# Patient Record
Sex: FEMALE | Race: WHITE | HISPANIC OR LATINO | Employment: UNEMPLOYED | URBAN - METROPOLITAN AREA
[De-identification: names, ages, dates, MRNs, and addresses within clinical notes are randomized per-mention and may not be internally consistent; named-entity substitution may affect disease eponyms.]

---

## 2019-01-02 ENCOUNTER — TELEPHONE (OUTPATIENT)
Dept: FAMILY MEDICINE | Facility: CLINIC | Age: 49
End: 2019-01-02

## 2019-01-02 NOTE — TELEPHONE ENCOUNTER
----- Message from Yesenia Carrington sent at 1/2/2019  1:28 PM CST -----  Contact: [phone]  Pt its requesting a new pt appointment as soon as possible , states she is having problems . Pt speaks Indonesian . Please advise

## 2019-01-03 ENCOUNTER — OFFICE VISIT (OUTPATIENT)
Dept: FAMILY MEDICINE | Facility: CLINIC | Age: 49
End: 2019-01-03

## 2019-01-03 VITALS
SYSTOLIC BLOOD PRESSURE: 114 MMHG | DIASTOLIC BLOOD PRESSURE: 80 MMHG | WEIGHT: 155 LBS | HEIGHT: 65 IN | BODY MASS INDEX: 25.83 KG/M2 | OXYGEN SATURATION: 97 % | HEART RATE: 73 BPM

## 2019-01-03 DIAGNOSIS — F41.1 GENERALIZED ANXIETY DISORDER: ICD-10-CM

## 2019-01-03 DIAGNOSIS — M25.511 CHRONIC RIGHT SHOULDER PAIN: Primary | ICD-10-CM

## 2019-01-03 DIAGNOSIS — G56.03 BILATERAL CARPAL TUNNEL SYNDROME: ICD-10-CM

## 2019-01-03 DIAGNOSIS — G89.29 CHRONIC RIGHT SHOULDER PAIN: Primary | ICD-10-CM

## 2019-01-03 PROCEDURE — 99999 PR PBB SHADOW E&M-EST. PATIENT-LVL III: CPT | Mod: PBBFAC,,, | Performed by: FAMILY MEDICINE

## 2019-01-03 PROCEDURE — 99202 PR OFFICE/OUTPT VISIT, NEW, LEVL II, 15-29 MIN: ICD-10-PCS | Mod: S$PBB,,, | Performed by: FAMILY MEDICINE

## 2019-01-03 PROCEDURE — 99202 OFFICE O/P NEW SF 15 MIN: CPT | Mod: S$PBB,,, | Performed by: FAMILY MEDICINE

## 2019-01-03 PROCEDURE — 99999 PR PBB SHADOW E&M-EST. PATIENT-LVL III: ICD-10-PCS | Mod: PBBFAC,,, | Performed by: FAMILY MEDICINE

## 2019-01-03 PROCEDURE — 99213 OFFICE O/P EST LOW 20 MIN: CPT | Mod: PBBFAC,PO | Performed by: FAMILY MEDICINE

## 2019-01-03 RX ORDER — FLUOXETINE 20 MG/1
20 TABLET ORAL DAILY
COMMUNITY
End: 2019-01-03 | Stop reason: ALTCHOICE

## 2019-01-03 RX ORDER — NAPROXEN 500 MG/1
500 TABLET ORAL 2 TIMES DAILY WITH MEALS
Qty: 60 TABLET | Refills: 1 | Status: SHIPPED | OUTPATIENT
Start: 2019-01-03 | End: 2022-07-18

## 2019-01-03 RX ORDER — ESCITALOPRAM OXALATE 10 MG/1
10 TABLET ORAL DAILY
Qty: 30 TABLET | Refills: 1 | Status: SHIPPED | OUTPATIENT
Start: 2019-01-03 | End: 2022-07-18

## 2019-01-03 NOTE — PROGRESS NOTES
Subjective:       Patient ID: Cherry Thomas is a 38 y.o. female.    Chief Complaint: Numbness (bilateral arm/hands x 2 wks)    HPI   39 yo [place]ian female with anxiety disorder presents c/o numbness in bilateral arms and hands. Pt notes intermittent symptoms for the past 5 days. Pt was seen in ER at [place] for this complaint. Pt notes that an evaluation of her heart and labs was normal in the ER. Pt notes that she did not have heart problems or an stroke. Pt was told that her symptoms were related to anxiety or dehydration. Pt has been told that she had carpal tunnel syndrome in [place]. Pt notes pain and numbness in left arm and pain in right arm. Symptoms are worse at night and disturb her sleep. Pt notes a burning sensation in her upper back. 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  Pt notes being very anxious. Pt has been on Fluoxetine for the past 2 years.  Review of Systems   Musculoskeletal:        See HPI   Neurological:        See HPI   Psychiatric/Behavioral:        See HPI       Objective:      Physical Exam   Constitutional: She is oriented to person, place, and time. She appears well-developed and well-nourished. No distress.   HENT:   Head: Normocephalic and atraumatic.   Neck: Normal range of motion. Neck supple. No JVD present. No thyromegaly present.   Cardiovascular: Normal rate, regular rhythm, normal heart sounds and intact distal pulses. Exam reveals no gallop and no friction rub.   No murmur heard.  Pulmonary/Chest: Effort normal and breath sounds normal. She has no wheezes. She has no rales.   Abdominal: Soft. Bowel sounds are normal. She exhibits no mass. There is no tenderness.   Musculoskeletal:        Right shoulder: She exhibits decreased range of motion and tenderness. She exhibits no bony tenderness and no deformity.        Left shoulder: She exhibits normal range of motion, no tenderness, no bony tenderness and no deformity.   Lymphadenopathy:     She has no  cervical adenopathy.   Neurological: She is alert and oriented to person, place, and time. She has normal strength and normal reflexes. No cranial nerve deficit or sensory deficit.   Positive Phalen's test bilaterally. Negative Tinel's test bilaterally.   Skin: Skin is warm and dry. She is not diaphoretic.   Psychiatric: Her speech is normal and behavior is normal. Judgment and thought content normal. Her mood appears anxious. She is not actively hallucinating. Cognition and memory are normal. She is attentive.   Vitals reviewed.      Assessment:       See plan  Plan:       Cherry was seen today for numbness.    Diagnoses and all orders for this visit:    Chronic right shoulder pain  -     naproxen (NAPROSYN) 500 MG tablet; Take 1 tablet (500 mg total) by mouth 2 (two) times daily with meals.    Bilateral carpal tunnel syndrome  -     naproxen (NAPROSYN) 500 MG tablet; Take 1 tablet (500 mg total) by mouth 2 (two) times daily with meals.  -    Wrist braces advised.    Generalized anxiety disorder: Uncontrolled  -     Start escitalopram oxalate (LEXAPRO) 10 MG tablet; Take 1 tablet (10 mg total) by mouth once daily.  -    Discontinue Fluoxetine     BMI 25.0-25.9,adult    F/U in 4 weeks

## 2019-11-15 DIAGNOSIS — Z12.39 BREAST CANCER SCREENING: ICD-10-CM

## 2021-02-25 ENCOUNTER — OFFICE VISIT (OUTPATIENT)
Dept: OBSTETRICS AND GYNECOLOGY | Facility: CLINIC | Age: 51
End: 2021-02-25

## 2021-02-25 VITALS
HEIGHT: 64 IN | WEIGHT: 152.56 LBS | BODY MASS INDEX: 26.05 KG/M2 | SYSTOLIC BLOOD PRESSURE: 118 MMHG | DIASTOLIC BLOOD PRESSURE: 80 MMHG

## 2021-02-25 DIAGNOSIS — Z01.419 GYNECOLOGIC EXAM NORMAL: ICD-10-CM

## 2021-02-25 DIAGNOSIS — R10.2 PELVIC PAIN: Primary | ICD-10-CM

## 2021-02-25 PROCEDURE — 99203 OFFICE O/P NEW LOW 30 MIN: CPT | Mod: S$PBB,,, | Performed by: SPECIALIST

## 2021-02-25 PROCEDURE — 88175 CYTOPATH C/V AUTO FLUID REDO: CPT

## 2021-02-25 PROCEDURE — 99213 OFFICE O/P EST LOW 20 MIN: CPT | Mod: PBBFAC,PN | Performed by: SPECIALIST

## 2021-02-25 PROCEDURE — 99999 PR PBB SHADOW E&M-EST. PATIENT-LVL III: ICD-10-PCS | Mod: PBBFAC,,, | Performed by: SPECIALIST

## 2021-02-25 PROCEDURE — 87624 HPV HI-RISK TYP POOLED RSLT: CPT

## 2021-02-25 PROCEDURE — 99999 PR PBB SHADOW E&M-EST. PATIENT-LVL III: CPT | Mod: PBBFAC,,, | Performed by: SPECIALIST

## 2021-02-25 PROCEDURE — 99203 PR OFFICE/OUTPT VISIT, NEW, LEVL III, 30-44 MIN: ICD-10-PCS | Mod: S$PBB,,, | Performed by: SPECIALIST

## 2021-02-25 RX ORDER — NAPROXEN 500 MG/1
500 TABLET ORAL 2 TIMES DAILY WITH MEALS
Qty: 60 TABLET | Refills: 2 | Status: SHIPPED | OUTPATIENT
Start: 2021-02-25 | End: 2022-02-25

## 2021-02-26 ENCOUNTER — TELEPHONE (OUTPATIENT)
Dept: OBSTETRICS AND GYNECOLOGY | Facility: CLINIC | Age: 51
End: 2021-02-26

## 2021-02-26 DIAGNOSIS — R10.2 PELVIC PAIN IN FEMALE: Primary | ICD-10-CM

## 2021-03-01 ENCOUNTER — TELEPHONE (OUTPATIENT)
Dept: OBSTETRICS AND GYNECOLOGY | Facility: CLINIC | Age: 51
End: 2021-03-01

## 2021-03-03 ENCOUNTER — TELEPHONE (OUTPATIENT)
Dept: OBSTETRICS AND GYNECOLOGY | Facility: CLINIC | Age: 51
End: 2021-03-03

## 2021-03-04 ENCOUNTER — TELEPHONE (OUTPATIENT)
Dept: OBSTETRICS AND GYNECOLOGY | Facility: CLINIC | Age: 51
End: 2021-03-04

## 2021-03-04 LAB
CLINICAL INFO: NORMAL
CYTO CVX: NORMAL
CYTOLOGIST CVX/VAG CYTO: NORMAL
CYTOLOGY CMNT CVX/VAG CYTO-IMP: NORMAL
CYTOLOGY PAP THIN PREP EXPLANATION: NORMAL
DATE OF PREVIOUS PAP: NORMAL
DATE PREVIOUS BX: NO
HPV I/H RISK 4 DNA CVX QL NAA+PROBE: NOT DETECTED
LMP START DATE: NORMAL
SPECIMEN SOURCE CVX/VAG CYTO: NORMAL
STAT OF ADQ CVX/VAG CYTO-IMP: NORMAL

## 2022-07-18 ENCOUNTER — OFFICE VISIT (OUTPATIENT)
Dept: OBSTETRICS AND GYNECOLOGY | Facility: CLINIC | Age: 52
End: 2022-07-18

## 2022-07-18 VITALS
OXYGEN SATURATION: 97 % | BODY MASS INDEX: 25.78 KG/M2 | HEART RATE: 100 BPM | DIASTOLIC BLOOD PRESSURE: 80 MMHG | TEMPERATURE: 98 F | WEIGHT: 151 LBS | RESPIRATION RATE: 18 BRPM | SYSTOLIC BLOOD PRESSURE: 132 MMHG | HEIGHT: 64 IN

## 2022-07-18 DIAGNOSIS — N83.202 CYST OF LEFT OVARY: ICD-10-CM

## 2022-07-18 DIAGNOSIS — R92.8 ABNORMAL MAMMOGRAM OF RIGHT BREAST: ICD-10-CM

## 2022-07-18 DIAGNOSIS — M79.601 ARM PAIN, DIFFUSE, RIGHT: ICD-10-CM

## 2022-07-18 DIAGNOSIS — R10.2 PELVIC PAIN: Primary | ICD-10-CM

## 2022-07-18 PROCEDURE — 99999 PR PBB SHADOW E&M-EST. PATIENT-LVL V: CPT | Mod: PBBFAC,,, | Performed by: OBSTETRICS & GYNECOLOGY

## 2022-07-18 PROCEDURE — 99215 PR OFFICE/OUTPT VISIT, EST, LEVL V, 40-54 MIN: ICD-10-PCS | Mod: S$PBB,,, | Performed by: OBSTETRICS & GYNECOLOGY

## 2022-07-18 PROCEDURE — 99999 PR PBB SHADOW E&M-EST. PATIENT-LVL V: ICD-10-PCS | Mod: PBBFAC,,, | Performed by: OBSTETRICS & GYNECOLOGY

## 2022-07-18 PROCEDURE — 99215 OFFICE O/P EST HI 40 MIN: CPT | Mod: PBBFAC,PO | Performed by: OBSTETRICS & GYNECOLOGY

## 2022-07-18 PROCEDURE — 99215 OFFICE O/P EST HI 40 MIN: CPT | Mod: S$PBB,,, | Performed by: OBSTETRICS & GYNECOLOGY

## 2022-07-18 NOTE — PROGRESS NOTES
"  Subjective:   Chief Complaint:  Ovarian Cyst       Patient ID: Cherry Salguero is a  51 y.o. female.  (Primary language:  Spanish.    Video  used: # [id number])    2022     She presents today due to the followin)  She reports over the last few years throbbing episodes of left lower quadrant pain.  She has a previous reported pelvic ultrasound which noted a possible cyst on the left ovary.  She was told it was "nothing to worry about" but is concerned regarding the persistent left lower quadrant pain.  The ultrasound was not performed within the [facility] system and the patient does not recall the exact location of the ultrasound.    2)    She reports right arm pain which runs from the shoulder to the wrist and is associated with arm numbness.  She reports a mammogram being performed outside of the [facility] system with an abnormal finding and the recommendation of follow-up ultrasound.   She had a lesion in the right axilla excised by Dermatology.  This was found to be in fibroepithelial polyp with inflammation in 2022.  Dermatology did not feel this was playing a role in her arm pain.      She reports during this process undergoing a mammogram.  This was performed outside the [facility] system.  She reports that further   Right breast studies were requested but she has not had these performed.    3) She is concerned regarding the possibility of a retained tampon.    While there are office notes within the [facility] system the patient reports a number of her test were performed at a "Health clinic in [place]" though she does not recall the name or actual location.    GYN & OB History  Patient's last menstrual period was 2022 (exact date).     Date of Last Pap: No result found    OB History    Para Term  AB Living   2 1 1   1 1   SAB IAB Ectopic Multiple Live Births                  # Outcome Date GA Lbr Alex/2nd Weight Sex Delivery Anes PTL Lv   2 AB            1 " Term               Obstetric Comments    Caesarean section x1 and SAB x1         Past Medical History:   Diagnosis Date    Abnormal Pap smear of cervix     Anxiety         Past Surgical History:   Procedure Laterality Date     SECTION      x 1 male    EYE SURGERY          Review of patient's allergies indicates:  No Known Allergies     Medications  No current outpatient medications on file.       Review of Systems  Review of Systems   Constitutional: Negative for fever and unexpected weight change.   HENT: Negative.    Respiratory: Negative for cough and shortness of breath.    Cardiovascular: Negative for chest pain.   Gastrointestinal: Negative for abdominal pain, nausea and vomiting.   Genitourinary: Negative for dysuria and urgency.          Gyn as per HPI   Musculoskeletal: Negative for myalgias.        Right arm pain and numbness as per HPI   Integumentary:  Negative for rash.   Neurological: Negative.  Negative for headaches.   Breast: negative.          As per HPI              Objective:      Vitals:    22 1305   BP: 132/80   Pulse: 100   Resp: 18   Temp: 98.2 °F (36.8 °C)     Physical Exam:   Constitutional: She appears well-developed and well-nourished.    HENT:   Head: Normocephalic.     Neck: No thyroid mass present.    Cardiovascular: Normal rate, regular rhythm and normal heart sounds.     Pulmonary/Chest: Effort normal and breath sounds normal. Right breast exhibits no mass, no nipple discharge and no skin change. Left breast exhibits no mass, no nipple discharge and no skin change.        Abdominal: Soft. There is no abdominal tenderness.     Genitourinary:    Inguinal canal, vagina, uterus, right adnexa and left adnexa normal.      Pelvic exam was performed with patient supine.   The external female genitalia was normal.   No external genitalia lesions identified,Cervix is normal. Right adnexum displays no mass and no tenderness. Left adnexum displays no mass and no tenderness. No  tenderness or bleeding in the vagina. Uterus is not tender. Normal urethral meatus.Urethra findings: no tendernessBladder findings: no bladder tenderness          Musculoskeletal:      Right lower leg: No edema.      Left lower leg: No edema.      Lymphadenopathy:     She has no cervical adenopathy. No inguinal adenopathy noted on the right or left side.    Neurological: She is alert. She has normal strength and normal reflexes.   No gross defects noted.  Equal right and left  strength    Skin: Skin is warm and dry.    Psychiatric: Mood normal.           Pap smear from February of 2021 noted no cervical abnormalities and was HPV negative.    Assessment:        1. Pelvic pain    2. Abnormal mammogram of right breast    3. Arm pain, diffuse, right    4. Cyst of left ovary         Plan:      Pelvic pain  -     US Pelvis Comp with Transvag NON-OB (xpd; Future; Expected date: 07/18/2022    Abnormal mammogram of right breast  -     US Breast Right Complete; Future; Expected date: 07/18/2022  -     Mammo Digital Diagnostic Bilat with Kojo; Future; Expected date: 07/18/2022    Arm pain, diffuse, right  -     Ambulatory referral/consult to Neurology; Future; Expected date: 07/25/2022    Cyst of left ovary  -     US Pelvis Comp with Transvag NON-OB (xpd; Future; Expected date: 07/18/2022         Follow up for ASAP after recommended testing obtained, Follow-up on today's evaluation.       The above was reviewed and discussed at length with the patient with the aid of the video .    We discussed her history as above as well as findings on physical exam.      At this time will proceed as follows:    The patient will attempt to obtain her previous radiologic studies.    A repeat  Pelvic ultrasound, mammogram and right breast ultrasound have been ordered.    We discussed the fact that her arm pain/ numbness may be of a neurological cause and as such she is being referred to neurology. .  We discussed her previous  diagnosis of carpal tunnel syndrome.    We will base further evaluation treatment results of the patient's repeat studies.      The patient's questions regarding the above were answered and she is in agreement this plan.

## 2022-07-19 ENCOUNTER — HOSPITAL ENCOUNTER (OUTPATIENT)
Dept: RADIOLOGY | Facility: HOSPITAL | Age: 52
Discharge: HOME | End: 2022-07-19
Attending: OBSTETRICS & GYNECOLOGY

## 2022-07-19 DIAGNOSIS — N83.202 CYST OF LEFT OVARY: ICD-10-CM

## 2022-07-19 DIAGNOSIS — R10.2 PELVIC PAIN: ICD-10-CM

## 2022-07-19 PROCEDURE — 76830 US PELVIS COMP WITH TRANSVAG NON-OB (XPD): ICD-10-PCS | Mod: 26,,, | Performed by: RADIOLOGY

## 2022-07-19 PROCEDURE — 76856 US EXAM PELVIC COMPLETE: CPT | Mod: 26,,, | Performed by: RADIOLOGY

## 2022-07-19 PROCEDURE — 76856 US PELVIS COMP WITH TRANSVAG NON-OB (XPD): ICD-10-PCS | Mod: 26,,, | Performed by: RADIOLOGY

## 2022-07-19 PROCEDURE — 76830 TRANSVAGINAL US NON-OB: CPT | Mod: TC,PO

## 2022-07-19 PROCEDURE — 76830 TRANSVAGINAL US NON-OB: CPT | Mod: 26,,, | Performed by: RADIOLOGY

## 2022-08-04 ENCOUNTER — HOSPITAL ENCOUNTER (OUTPATIENT)
Dept: RADIOLOGY | Facility: HOSPITAL | Age: 52
Discharge: HOME | End: 2022-08-04
Attending: OBSTETRICS & GYNECOLOGY

## 2022-08-04 DIAGNOSIS — R92.8 ABNORMAL MAMMOGRAM OF RIGHT BREAST: ICD-10-CM

## 2022-08-04 PROCEDURE — 76642 ULTRASOUND BREAST LIMITED: CPT | Mod: 26,RT,, | Performed by: RADIOLOGY

## 2022-08-04 PROCEDURE — 76642 US BREAST RIGHT LIMITED: ICD-10-PCS | Mod: 26,RT,, | Performed by: RADIOLOGY

## 2022-08-04 PROCEDURE — 77066 DX MAMMO INCL CAD BI: CPT | Mod: TC

## 2022-08-04 PROCEDURE — 76642 ULTRASOUND BREAST LIMITED: CPT | Mod: TC,RT

## 2022-08-04 PROCEDURE — 77066 DX MAMMO INCL CAD BI: CPT | Mod: 26,,, | Performed by: RADIOLOGY

## 2022-08-04 PROCEDURE — 77062 BREAST TOMOSYNTHESIS BI: CPT | Mod: 26,,, | Performed by: RADIOLOGY

## 2022-08-04 PROCEDURE — 77066 MAMMO DIGITAL DIAGNOSTIC BILAT WITH TOMO: ICD-10-PCS | Mod: 26,,, | Performed by: RADIOLOGY

## 2022-08-04 PROCEDURE — 77062 MAMMO DIGITAL DIAGNOSTIC BILAT WITH TOMO: ICD-10-PCS | Mod: 26,,, | Performed by: RADIOLOGY

## 2022-08-09 ENCOUNTER — OFFICE VISIT (OUTPATIENT)
Dept: OBSTETRICS AND GYNECOLOGY | Facility: CLINIC | Age: 52
End: 2022-08-09

## 2022-08-09 VITALS
HEART RATE: 80 BPM | HEIGHT: 64 IN | DIASTOLIC BLOOD PRESSURE: 86 MMHG | BODY MASS INDEX: 25.16 KG/M2 | WEIGHT: 147.38 LBS | RESPIRATION RATE: 16 BRPM | SYSTOLIC BLOOD PRESSURE: 130 MMHG

## 2022-08-09 DIAGNOSIS — R10.2 PELVIC PAIN IN FEMALE: Primary | ICD-10-CM

## 2022-08-09 DIAGNOSIS — R92.8 ABNORMAL MAMMOGRAM OF RIGHT BREAST: ICD-10-CM

## 2022-08-09 DIAGNOSIS — R20.0 NUMBNESS AND TINGLING OF RIGHT ARM: ICD-10-CM

## 2022-08-09 DIAGNOSIS — N83.202 CYST OF LEFT OVARY: ICD-10-CM

## 2022-08-09 DIAGNOSIS — R20.2 NUMBNESS AND TINGLING OF RIGHT ARM: ICD-10-CM

## 2022-08-09 PROCEDURE — 99213 OFFICE O/P EST LOW 20 MIN: CPT | Mod: S$PBB,,, | Performed by: OBSTETRICS & GYNECOLOGY

## 2022-08-09 PROCEDURE — 99999 PR PBB SHADOW E&M-EST. PATIENT-LVL III: CPT | Mod: PBBFAC,,, | Performed by: OBSTETRICS & GYNECOLOGY

## 2022-08-09 PROCEDURE — 99213 OFFICE O/P EST LOW 20 MIN: CPT | Mod: PBBFAC,PO | Performed by: OBSTETRICS & GYNECOLOGY

## 2022-08-09 PROCEDURE — 99213 PR OFFICE/OUTPT VISIT, EST, LEVL III, 20-29 MIN: ICD-10-PCS | Mod: S$PBB,,, | Performed by: OBSTETRICS & GYNECOLOGY

## 2022-08-09 PROCEDURE — 99999 PR PBB SHADOW E&M-EST. PATIENT-LVL III: ICD-10-PCS | Mod: PBBFAC,,, | Performed by: OBSTETRICS & GYNECOLOGY

## 2022-08-09 NOTE — PROGRESS NOTES
"  Subjective:   Chief Complaint:  Follow-up (2 week follow up ultrasound)       Patient ID: Cherry Salguero is a  51 y.o. female.  (Primary language:  Haitian.    Video  used: # [id number])    2022    She presents today due to the followin)  She reports over the last few years throbbing episodes of left lower quadrant pain.  She has a previous reported pelvic ultrasound which noted a possible cyst on the left ovary.  She was told it was "nothing to worry about" but is concerned regarding the persistent left lower quadrant pain.  The ultrasound was not performed within the [facility] system and the patient does not recall the exact location of the ultrasound.    2)    She reports right arm pain which runs from the shoulder to the wrist and is associated with arm numbness.  She reports a mammogram being performed outside of the [facility] system with an abnormal finding and the recommendation of follow-up ultrasound.   She had a lesion in the right axilla excised by Dermatology.  This was found to be in fibroepithelial polyp with inflammation in 2022.  Dermatology did not feel this was playing a role in her arm pain.      She reports during this process undergoing a mammogram.  This was performed outside the [facility] system.  She reports that further   Right breast studies were requested but she has not had these performed.    3) She is concerned regarding the possibility of a retained tampon.    While there are office notes within the [facility] the patient reports a number of her test were performed at a "Health clinic in [place]" though she does not recall the name or actual location.      Date:  2022    The patient and her  today for follow-up.  She was last seen as above.  She has declined  at today's evaluation.    Since her last evaluation she has undergone pelvic ultrasound as well as further right breast radiologic studies.  With the exception of " persistent right arm numbness from the shoulder to the wrist she is without complaints at this time.      GYN & OB History  Patient's last menstrual period was 2022 (approximate).     Date of Last Pap: No result found    OB History    Para Term  AB Living   2 1 1   1 1   SAB IAB Ectopic Multiple Live Births                  # Outcome Date GA Lbr Alex/2nd Weight Sex Delivery Anes PTL Lv   2 AB            1 Term               Obstetric Comments    Caesarean section x1 and SAB x1         Past Medical History:   Diagnosis Date    Abnormal Pap smear of cervix     Anxiety         Past Surgical History:   Procedure Laterality Date     SECTION      x 1 male    EYE SURGERY      SKIN LESION EXCISION  2022    fibroepithelial polyp        Review of patient's allergies indicates:  No Known Allergies     Medications  No current outpatient medications on file.       Review of Systems  Review of Systems   Constitutional: Negative for fever and unexpected weight change.   Respiratory: Negative for cough and shortness of breath.    Cardiovascular: Negative for chest pain.   Gastrointestinal: Negative for abdominal pain, nausea and vomiting.   Genitourinary: Negative for dysuria and urgency.          Gyn as per HPI   Musculoskeletal:        Right arm pain and numbness as per HPI   Neurological: Positive for numbness. Negative for headaches.   Breast: negative.          As per HPI              Objective:      Vitals:    22 1534   BP: 130/86   Pulse: 80   Resp: 16     Physical Exam:   Constitutional: She appears well-developed and well-nourished.    HENT:   Head: Normocephalic.     Neck: No thyroid mass and no thyromegaly present.    Cardiovascular: Normal rate, regular rhythm and normal heart sounds.     Pulmonary/Chest: Effort normal and breath sounds normal. No respiratory distress.        Abdominal: Soft. There is no abdominal tenderness.             Musculoskeletal:      Right lower leg: No  edema.      Left lower leg: No edema.       Neurological: She is alert. No cranial nerve deficit.    Skin: Skin is warm and dry.    Psychiatric: Mood normal.           Pap smear from February of 2021 noted no cervical abnormalities and was HPV negative.    8/4/2022 Routine                 Narrative & Impression  Result:  Mammo Digital Diagnostic Bilat with Kojo  US Breast Right Limited     History:  Patient is 51 y.o. and is seen for diagnostic imaging.     Films Compared:  Compared to: 08/20/2021 Mammo Previous     Findings:  Diagnostic Mammogram:  Computer-aided detection was utilized in the interpretation of this examination. This procedure was performed using tomosynthesis.       The breasts have scattered areas of fibroglandular density. There is no evidence of suspicious masses, microcalcifications or architectural distortion.      Breast Ultrasound:  Limited Breast ultrasound was performed in the outer right breast and right axilla. There is no suspicious abnormality.      Impression:  There is no mammographic or sonographic evidence of malignancy.     BI-RADS Category 1: Negative     Recommendation:  Routine screening mammogram in 1 year is recommended.     Your estimated lifetime risk of breast cancer (to age 85) based on Tyrer-Cuzick risk assessment model is Tyrer-Cuzick: 10.33 %. According to the American Cancer Society, patients with a lifetime breast cancer risk of 20% or higher might benefit from supplemental screening tests.        8/4/2022 Routine                 Narrative & Impression  Result:  Mammo Digital Diagnostic Bilat with Kojo  US Breast Right Limited     History:  Patient is 51 y.o. and is seen for diagnostic imaging.     Films Compared:  Compared to: 08/20/2021 Mammo Previous     Findings:  Diagnostic Mammogram:  Computer-aided detection was utilized in the interpretation of this examination. This procedure was performed using tomosynthesis.       The breasts have scattered areas of  fibroglandular density. There is no evidence of suspicious masses, microcalcifications or architectural distortion.      Breast Ultrasound:  Limited Breast ultrasound was performed in the outer right breast and right axilla. There is no suspicious abnormality.      Impression:  There is no mammographic or sonographic evidence of malignancy.     BI-RADS Category 1: Negative     Recommendation:  Routine screening mammogram in 1 year is recommended.     Your estimated lifetime risk of breast cancer (to age 85) based on Tyrer-Cuzick risk assessment model is Tyrer-Cuzick: 10.33 %. According to the American Cancer Society, patients with a lifetime breast cancer risk of 20% or higher might benefit from supplemental screening tests.       7/19/2022 Routine     Narrative & Impression  EXAMINATION:  US PELVIS COMP WITH TRANSVAG NON-OB (XPD)     CLINICAL HISTORY:  Pelvic and perineal pain     TECHNIQUE:  Transabdominal sonography of the pelvis was performed, followed by transvaginal sonography to better evaluate the uterus and ovaries.     COMPARISON:  None.     FINDINGS:  Uterus:     Size: 8.5 cm     Masses: 12 x 12 x 14 mm echogenic areas suggesting uterine fibroid.     Endometrium: Closed and measures 3 mm     Right ovary:     Size: 2.8 x 1 x 2.1 cm     Appearance: Normal     Vascular flow: Normal.     Left ovary:     Size: 3.6 x 2.2 x 3.4 cm     Appearance: 1.4 cm cyst     Vascular Flow: Normal.     Free Fluid:     None.     Impression:     Findings consistent with 14 mm uterine fibroid.  14 mm left ovarian cyst.     Electronically signed by: Rose Kenny MD  Date:                                            07/19/2022  Time:                                           17:02      Assessment:        1. Pelvic pain in female    2. Cyst of left ovary    3. Abnormal mammogram of right breast    4. Numbness and tingling of right arm         Plan:      Pelvic pain in female    Cyst of left ovary    Abnormal mammogram of right  breast    Numbness and tingling of right arm  -     Ambulatory referral/consult to Neurology; Future; Expected date: 08/17/2022         Follow up for as needed / for any GYN related issues.       The above was reviewed and discussed with the patient her .  We reviewed the findings of a small uterine myoma as well as a small left ovarian cyst but overall benign findings on ultrasound.    We discussed the benign findings on recent mammogram and breast ultrasound.  The patient 's questions regarding the above were answered.    In light of the continued arm numbness she has been referred for neurological evaluation.    Expectant management from a gyn standpoint at this time.

## 2023-03-27 ENCOUNTER — OFFICE VISIT (OUTPATIENT)
Dept: OBSTETRICS AND GYNECOLOGY | Facility: CLINIC | Age: 53
End: 2023-03-27

## 2023-03-27 VITALS
HEIGHT: 64 IN | DIASTOLIC BLOOD PRESSURE: 90 MMHG | BODY MASS INDEX: 25.67 KG/M2 | SYSTOLIC BLOOD PRESSURE: 130 MMHG | RESPIRATION RATE: 16 BRPM | WEIGHT: 150.38 LBS

## 2023-03-27 DIAGNOSIS — Z12.31 ENCOUNTER FOR SCREENING MAMMOGRAM FOR MALIGNANT NEOPLASM OF BREAST: ICD-10-CM

## 2023-03-27 DIAGNOSIS — Z12.11 SCREENING FOR COLON CANCER: ICD-10-CM

## 2023-03-27 DIAGNOSIS — Z12.4 SCREENING FOR MALIGNANT NEOPLASM OF CERVIX: ICD-10-CM

## 2023-03-27 DIAGNOSIS — N89.8 VAGINAL ODOR: ICD-10-CM

## 2023-03-27 DIAGNOSIS — Z01.419 WELL WOMAN EXAM WITH ROUTINE GYNECOLOGICAL EXAM: Primary | ICD-10-CM

## 2023-03-27 PROCEDURE — 81514 NFCT DS BV&VAGINITIS DNA ALG: CPT | Performed by: OBSTETRICS & GYNECOLOGY

## 2023-03-27 PROCEDURE — 88175 CYTOPATH C/V AUTO FLUID REDO: CPT | Performed by: PATHOLOGY

## 2023-03-27 PROCEDURE — 99999 PR PBB SHADOW E&M-EST. PATIENT-LVL III: ICD-10-PCS | Mod: PBBFAC,,, | Performed by: OBSTETRICS & GYNECOLOGY

## 2023-03-27 PROCEDURE — 99213 OFFICE O/P EST LOW 20 MIN: CPT | Mod: PBBFAC,PO | Performed by: OBSTETRICS & GYNECOLOGY

## 2023-03-27 PROCEDURE — 99999 PR PBB SHADOW E&M-EST. PATIENT-LVL III: CPT | Mod: PBBFAC,,, | Performed by: OBSTETRICS & GYNECOLOGY

## 2023-03-27 PROCEDURE — 87624 HPV HI-RISK TYP POOLED RSLT: CPT | Performed by: OBSTETRICS & GYNECOLOGY

## 2023-03-27 PROCEDURE — 99396 PR PREVENTIVE VISIT,EST,40-64: ICD-10-PCS | Mod: S$PBB,,, | Performed by: OBSTETRICS & GYNECOLOGY

## 2023-03-27 PROCEDURE — 99396 PREV VISIT EST AGE 40-64: CPT | Mod: S$PBB,,, | Performed by: OBSTETRICS & GYNECOLOGY

## 2023-03-27 PROCEDURE — 88141 CYTOPATH C/V INTERPRET: CPT | Mod: ,,, | Performed by: PATHOLOGY

## 2023-03-27 PROCEDURE — 88141 PR  CYTOPATH CERV/VAG INTERPRET: ICD-10-PCS | Mod: ,,, | Performed by: PATHOLOGY

## 2023-03-27 RX ORDER — METRONIDAZOLE 500 MG/1
500 TABLET ORAL EVERY 12 HOURS
Qty: 14 TABLET | Refills: 0 | Status: SHIPPED | OUTPATIENT
Start: 2023-03-27 | End: 2023-04-03

## 2023-03-27 NOTE — PROGRESS NOTES
Chief Complaint   Patient presents with    Annual Exam       History and Physical:  No LMP recorded (lmp unknown). Patient is perimenopausal.    Contraception: None    Cherry Salguero is a 52 y.o.  who presents today for her routine annual GYN exam.   From a gynecologic standpoint she reports an issue with vaginal odor which tends to come and go.  No current pelvic pain or abnormal bleeding.  The patient did not feel that a  was necessary today.    Allergies: Review of patient's allergies indicates:  No Known Allergies    Past Medical History:   Diagnosis Date    Abnormal Pap smear of cervix     Anxiety        Past Surgical History:   Procedure Laterality Date     SECTION      x 1 male    EYE SURGERY      SKIN LESION EXCISION  2022    fibroepithelial polyp       MEDS:   Current Outpatient Medications:     metroNIDAZOLE (FLAGYL) 500 MG tablet, Take 1 tablet (500 mg total) by mouth every 12 (twelve) hours. for 7 days, Disp: 14 tablet, Rfl: 0     OB History          2    Para   1    Term   1            AB   1    Living   1         SAB        IAB        Ectopic        Multiple        Live Births               Obstetric Comments    Caesarean section x1 and SAB x1               Social History     Socioeconomic History    Marital status: Single   Tobacco Use    Smoking status: Never    Smokeless tobacco: Never   Substance and Sexual Activity    Alcohol use: No    Drug use: No    Sexual activity: Yes     Partners: Male     Birth control/protection: None       Family History   Problem Relation Age of Onset    Diabetes Mother     Heart disease Father     Hypertension Father     Breast cancer Neg Hx     Ovarian cancer Neg Hx          Past medical and surgical history reviewed.   I have reviewed the patient's medical history in detail and updated the computerized patient record.      Review of Systems (at today's evaluation)  Review of Systems   Constitutional:  Negative for fever and  "unexpected weight change.   HENT:  Negative for congestion, ear pain, nosebleeds, sinus pain and sore throat.    Eyes: Negative.    Respiratory:  Negative for cough and shortness of breath.    Cardiovascular:  Negative for chest pain and palpitations.   Gastrointestinal:  Negative for constipation, diarrhea, nausea and vomiting.   Endocrine: Negative.    Genitourinary:  Negative for dysuria, frequency, hematuria and urgency.        Gyn as per HPI   Musculoskeletal:  Negative for arthralgias and myalgias.   Skin:  Negative for rash.   Neurological:  Negative for weakness and headaches.   Psychiatric/Behavioral:  The patient is not nervous/anxious.       Physical Exam:   BP (!) 130/90 (BP Location: Right arm, Patient Position: Sitting, BP Method: Large (Manual))   Resp 16   Ht 5' 4" (1.626 m)   Wt 68.2 kg (150 lb 5.7 oz)   LMP  (LMP Unknown)   BMI 25.81 kg/m²       Physical Exam:   Constitutional: She appears well-developed and well-nourished.    HENT:   Head: Normocephalic.     Neck: No thyroid mass present.    Cardiovascular:  Normal rate, regular rhythm and normal heart sounds.             Pulmonary/Chest: Effort normal and breath sounds normal. Right breast exhibits no mass, no nipple discharge and no skin change. Left breast exhibits no mass, no nipple discharge and no skin change.        Abdominal: Soft. There is no abdominal tenderness.     Genitourinary:    Inguinal canal, uterus, right adnexa and left adnexa normal.      Pelvic exam was performed with patient supine.   The external female genitalia was normal.   No external genitalia lesions identified,Cervix is normal. Right adnexum displays no mass and no tenderness. Left adnexum displays no mass and no tenderness. There is vaginal discharge in the vagina. No tenderness or bleeding in the vagina. Uterus is not tender. Normal urethral meatus.Urethra findings: no tendernessBladder findings: no bladder tenderness          Musculoskeletal:      Right lower " leg: No edema.      Left lower leg: No edema.      Lymphadenopathy:     She has no cervical adenopathy. No inguinal adenopathy noted on the right or left side.    Neurological: She is alert.   No gross defects noted    Skin: Skin is warm and dry.    Psychiatric: Mood normal.        Assessment:        1. Well woman exam with routine gynecological exam    2. Screening for malignant neoplasm of cervix    3. Encounter for screening mammogram for malignant neoplasm of breast    4. Screening for colon cancer    5. Vaginal odor         Plan:      Well woman exam with routine gynecological exam    Screening for malignant neoplasm of cervix  -     HPV High Risk Genotypes, PCR  -     Liquid-Based Pap Smear, Screening    Encounter for screening mammogram for malignant neoplasm of breast  -     Mammo Digital Screening Bilat; Future; Expected date: 03/27/2023    Screening for colon cancer  -     Case Request Endoscopy: COLONOSCOPY    Vaginal odor  -     Vaginosis Screen by DNA Probe  -     metroNIDAZOLE (FLAGYL) 500 MG tablet; Take 1 tablet (500 mg total) by mouth every 12 (twelve) hours. for 7 days  Dispense: 14 tablet; Refill: 0         No follow-ups on file.          The above was reviewed and discussed with the patient.    Annual exam and screening issues based on the patient's age, medical history and family history were reviewed / discussed.    In regards to the patient's vaginal odor, a vaginitis panel was obtained and the patient is being started on oral metronidazole 1 pill twice daily for 7 days.  The pros, cons, risks, benefits, alternatives and indications of the medication(s) prescribed, as well as appropriate use and potential side effects were discussed.    The patient's questions were answered, and she is in agreement with the current plan.

## 2023-03-28 LAB
BACTERIAL VAGINOSIS DNA: POSITIVE
CANDIDA GLABRATA DNA: NEGATIVE
CANDIDA KRUSEI DNA: NEGATIVE
CANDIDA RRNA VAG QL PROBE: NEGATIVE
T VAGINALIS RRNA GENITAL QL PROBE: NEGATIVE

## 2023-03-31 LAB
FINAL PATHOLOGIC DIAGNOSIS: NORMAL
HPV HR 12 DNA SPEC QL NAA+PROBE: NEGATIVE
HPV16 AG SPEC QL: NEGATIVE
HPV18 DNA SPEC QL NAA+PROBE: NEGATIVE
Lab: NORMAL

## 2023-05-02 ENCOUNTER — TELEPHONE (OUTPATIENT)
Dept: OBSTETRICS AND GYNECOLOGY | Facility: CLINIC | Age: 53
End: 2023-05-02

## 2023-05-02 RX ORDER — CLINDAMYCIN HYDROCHLORIDE 300 MG/1
300 CAPSULE ORAL 2 TIMES DAILY
Qty: 14 CAPSULE | Refills: 0 | Status: SHIPPED | OUTPATIENT
Start: 2023-05-02 | End: 2023-05-09

## 2023-05-02 NOTE — TELEPHONE ENCOUNTER
Pt in [facility] and [facility] provider gave her a rx (metronidazole) and told her if if did not work for her he had another rx for her to try.  She said the first one is not working and wants to know if the doctor would call in the other one he had in mind to [facility] on [address]. Please advise if pt needs appt.  LOV 3/27/23.

## 2023-05-02 NOTE — PROGRESS NOTES
As patient has been unresponsive to metronidazole regards to bacterial vaginosis the second-line treatment oral clindamycin has been ordered.

## 2023-05-03 NOTE — TELEPHONE ENCOUNTER
----- Message from Paul Boyd MD sent at 5/2/2023  5:46 PM CDT -----  Regarding: Recurrent bacterial vaginosis  Notify patient I have sent a prescription for the antibiotic clindamycin to her pharmacy.  This should hopefully address her recurrent bacterial vaginosis.  If she does not respond have her contact the office and will then move towards the compound treatment.

## 2023-05-03 NOTE — TELEPHONE ENCOUNTER
Pt notified medication was sent to pharmacy and to contact us if this antibiotic doesn't work. Pt voiced understanding.

## 2023-05-12 ENCOUNTER — PATIENT MESSAGE (OUTPATIENT)
Dept: GASTROENTEROLOGY | Facility: CLINIC | Age: 53
End: 2023-05-12

## 2023-06-26 ENCOUNTER — TELEPHONE (OUTPATIENT)
Dept: OBSTETRICS AND GYNECOLOGY | Facility: CLINIC | Age: 53
End: 2023-06-26

## 2023-06-26 NOTE — TELEPHONE ENCOUNTER
Pt states she still has an odor and the infection hasn't gone away. Pt has taken metronidazole and clindamycin. Please advise.

## 2023-06-26 NOTE — TELEPHONE ENCOUNTER
----- Message from Ze Torres sent at 6/26/2023  3:41 PM CDT -----  Regarding: Return Call  Contact: patient  Type:  Patient Returning Call    Who Called:patient  Who Left Message for Patient:office nurse  Does the patient know what this is regarding?:vaginal infection still there. Medication did not work. MD advised if medication don't work to call office for new medication  Would the patient rather a call back or a response via [facility]? call  Best Call Back Number:[phone]  Additional Information: please call patient to advise.

## 2023-06-27 NOTE — TELEPHONE ENCOUNTER
----- Message from Paul Boyd MD sent at 6/27/2023 11:33 AM CDT -----  Please contact this patient and let her know in regards to her vaginal odor as she has not responded to metronidazole or clindamycin I sent a prescription to the compounding pharmacy for a vaginal pH buffer.  They will contact her in the near future regarding this prescription.

## 2023-10-26 ENCOUNTER — PATIENT MESSAGE (OUTPATIENT)
Dept: GASTROENTEROLOGY | Facility: CLINIC | Age: 53
End: 2023-10-26

## 2024-02-05 DIAGNOSIS — R07.9 CHEST PAIN, UNSPECIFIED: Primary | ICD-10-CM
